# Patient Record
Sex: FEMALE | Race: WHITE | NOT HISPANIC OR LATINO | URBAN - METROPOLITAN AREA
[De-identification: names, ages, dates, MRNs, and addresses within clinical notes are randomized per-mention and may not be internally consistent; named-entity substitution may affect disease eponyms.]

---

## 2017-03-19 ENCOUNTER — INPATIENT (INPATIENT)
Facility: HOSPITAL | Age: 82
LOS: 3 days | Discharge: NURSING, REHAB OR LONG-TERM CARE | End: 2017-03-23
Attending: INTERNAL MEDICINE | Admitting: INTERNAL MEDICINE
Payer: MEDICARE

## 2017-03-19 VITALS
RESPIRATION RATE: 15 BRPM | HEIGHT: 71 IN | SYSTOLIC BLOOD PRESSURE: 156 MMHG | WEIGHT: 175.05 LBS | DIASTOLIC BLOOD PRESSURE: 69 MMHG | HEART RATE: 62 BPM | OXYGEN SATURATION: 100 %

## 2017-03-19 LAB
ALBUMIN SERPL ELPH-MCNC: 2.4 G/DL — LOW (ref 3.3–5)
ALBUMIN SERPL ELPH-MCNC: 3.1 G/DL — LOW (ref 3.3–5)
ALP SERPL-CCNC: 112 U/L — SIGNIFICANT CHANGE UP (ref 40–120)
ALP SERPL-CCNC: 89 U/L — SIGNIFICANT CHANGE UP (ref 40–120)
ALT FLD-CCNC: 24 U/L — SIGNIFICANT CHANGE UP (ref 12–78)
ALT FLD-CCNC: 33 U/L — SIGNIFICANT CHANGE UP (ref 12–78)
ANION GAP SERPL CALC-SCNC: 14 MMOL/L — SIGNIFICANT CHANGE UP (ref 5–17)
ANION GAP SERPL CALC-SCNC: 9 MMOL/L — SIGNIFICANT CHANGE UP (ref 5–17)
APPEARANCE UR: CLEAR — SIGNIFICANT CHANGE UP
APTT BLD: 38.7 SEC — HIGH (ref 27.5–37.4)
AST SERPL-CCNC: 48 U/L — HIGH (ref 15–37)
AST SERPL-CCNC: 75 U/L — HIGH (ref 15–37)
BACTERIA # UR AUTO: (no result)
BASOPHILS # BLD AUTO: 0.1 K/UL — SIGNIFICANT CHANGE UP (ref 0–0.2)
BASOPHILS # BLD AUTO: 0.1 K/UL — SIGNIFICANT CHANGE UP (ref 0–0.2)
BASOPHILS NFR BLD AUTO: 0.7 % — SIGNIFICANT CHANGE UP (ref 0–2)
BASOPHILS NFR BLD AUTO: 0.8 % — SIGNIFICANT CHANGE UP (ref 0–2)
BILIRUB DIRECT SERPL-MCNC: 0.3 MG/DL — HIGH (ref 0–0.2)
BILIRUB INDIRECT FLD-MCNC: 1 MG/DL — SIGNIFICANT CHANGE UP (ref 0.2–1)
BILIRUB SERPL-MCNC: 1.2 MG/DL — SIGNIFICANT CHANGE UP (ref 0.2–1.2)
BILIRUB SERPL-MCNC: 1.3 MG/DL — HIGH (ref 0.2–1.2)
BILIRUB SERPL-MCNC: 2.5 MG/DL — HIGH (ref 0.2–1.2)
BILIRUB UR-MCNC: NEGATIVE — SIGNIFICANT CHANGE UP
BUN SERPL-MCNC: 28 MG/DL — HIGH (ref 7–23)
BUN SERPL-MCNC: 29 MG/DL — HIGH (ref 7–23)
CALCIUM SERPL-MCNC: 8.5 MG/DL — SIGNIFICANT CHANGE UP (ref 8.5–10.1)
CALCIUM SERPL-MCNC: 9.3 MG/DL — SIGNIFICANT CHANGE UP (ref 8.5–10.1)
CHLORIDE SERPL-SCNC: 100 MMOL/L — SIGNIFICANT CHANGE UP (ref 96–108)
CHLORIDE SERPL-SCNC: 105 MMOL/L — SIGNIFICANT CHANGE UP (ref 96–108)
CK SERPL-CCNC: 818 U/L — HIGH (ref 26–192)
CO2 SERPL-SCNC: 28 MMOL/L — SIGNIFICANT CHANGE UP (ref 22–31)
CO2 SERPL-SCNC: 28 MMOL/L — SIGNIFICANT CHANGE UP (ref 22–31)
COLOR SPEC: YELLOW — SIGNIFICANT CHANGE UP
CREAT SERPL-MCNC: 0.61 MG/DL — SIGNIFICANT CHANGE UP (ref 0.5–1.3)
CREAT SERPL-MCNC: 0.61 MG/DL — SIGNIFICANT CHANGE UP (ref 0.5–1.3)
DIFF PNL FLD: (no result)
EOSINOPHIL # BLD AUTO: 0 K/UL — SIGNIFICANT CHANGE UP (ref 0–0.5)
EOSINOPHIL # BLD AUTO: 0 K/UL — SIGNIFICANT CHANGE UP (ref 0–0.5)
EOSINOPHIL NFR BLD AUTO: 0.1 % — SIGNIFICANT CHANGE UP (ref 0–6)
EOSINOPHIL NFR BLD AUTO: 0.2 % — SIGNIFICANT CHANGE UP (ref 0–6)
EPI CELLS # UR: SIGNIFICANT CHANGE UP
GLUCOSE SERPL-MCNC: 100 MG/DL — HIGH (ref 70–99)
GLUCOSE SERPL-MCNC: 134 MG/DL — HIGH (ref 70–99)
GLUCOSE UR QL: NEGATIVE MG/DL — SIGNIFICANT CHANGE UP
HCT VFR BLD CALC: 36 % — SIGNIFICANT CHANGE UP (ref 34.5–45)
HCT VFR BLD CALC: 42.3 % — SIGNIFICANT CHANGE UP (ref 34.5–45)
HGB BLD-MCNC: 11.7 G/DL — SIGNIFICANT CHANGE UP (ref 11.5–15.5)
HGB BLD-MCNC: 13.7 G/DL — SIGNIFICANT CHANGE UP (ref 11.5–15.5)
INR BLD: 1.22 RATIO — HIGH (ref 0.88–1.16)
KETONES UR-MCNC: (no result)
LACTATE SERPL-SCNC: 1.6 MMOL/L — SIGNIFICANT CHANGE UP (ref 0.7–2)
LEUKOCYTE ESTERASE UR-ACNC: NEGATIVE — SIGNIFICANT CHANGE UP
LYMPHOCYTES # BLD AUTO: 0.7 K/UL — LOW (ref 1–3.3)
LYMPHOCYTES # BLD AUTO: 0.7 K/UL — LOW (ref 1–3.3)
LYMPHOCYTES # BLD AUTO: 4.4 % — LOW (ref 13–44)
LYMPHOCYTES # BLD AUTO: 5.4 % — LOW (ref 13–44)
MAGNESIUM SERPL-MCNC: 1.6 MG/DL — LOW (ref 1.8–2.4)
MAGNESIUM SERPL-MCNC: 2.1 MG/DL — SIGNIFICANT CHANGE UP (ref 1.8–2.4)
MCHC RBC-ENTMCNC: 27.4 PG — SIGNIFICANT CHANGE UP (ref 27–34)
MCHC RBC-ENTMCNC: 27.6 PG — SIGNIFICANT CHANGE UP (ref 27–34)
MCHC RBC-ENTMCNC: 32.4 GM/DL — SIGNIFICANT CHANGE UP (ref 32–36)
MCHC RBC-ENTMCNC: 32.5 GM/DL — SIGNIFICANT CHANGE UP (ref 32–36)
MCV RBC AUTO: 84.7 FL — SIGNIFICANT CHANGE UP (ref 80–100)
MCV RBC AUTO: 85 FL — SIGNIFICANT CHANGE UP (ref 80–100)
MONOCYTES # BLD AUTO: 0.8 K/UL — SIGNIFICANT CHANGE UP (ref 0–0.9)
MONOCYTES # BLD AUTO: 1.1 K/UL — HIGH (ref 0–0.9)
MONOCYTES NFR BLD AUTO: 4.9 % — SIGNIFICANT CHANGE UP (ref 2–14)
MONOCYTES NFR BLD AUTO: 9.1 % — SIGNIFICANT CHANGE UP (ref 2–14)
NEUTROPHILS # BLD AUTO: 10.4 K/UL — HIGH (ref 1.8–7.4)
NEUTROPHILS # BLD AUTO: 14.6 K/UL — HIGH (ref 1.8–7.4)
NEUTROPHILS NFR BLD AUTO: 84.5 % — HIGH (ref 43–77)
NEUTROPHILS NFR BLD AUTO: 89.7 % — HIGH (ref 43–77)
NITRITE UR-MCNC: NEGATIVE — SIGNIFICANT CHANGE UP
PH UR: 5 — SIGNIFICANT CHANGE UP (ref 4.8–8)
PHOSPHATE SERPL-MCNC: 3 MG/DL — SIGNIFICANT CHANGE UP (ref 2.5–4.5)
PLATELET # BLD AUTO: 264 K/UL — SIGNIFICANT CHANGE UP (ref 150–400)
PLATELET # BLD AUTO: 337 K/UL — SIGNIFICANT CHANGE UP (ref 150–400)
POTASSIUM SERPL-MCNC: 3.2 MMOL/L — LOW (ref 3.5–5.3)
POTASSIUM SERPL-MCNC: 3.4 MMOL/L — LOW (ref 3.5–5.3)
POTASSIUM SERPL-SCNC: 3.2 MMOL/L — LOW (ref 3.5–5.3)
POTASSIUM SERPL-SCNC: 3.4 MMOL/L — LOW (ref 3.5–5.3)
PROT SERPL-MCNC: 5.7 GM/DL — LOW (ref 6–8.3)
PROT SERPL-MCNC: 7.3 GM/DL — SIGNIFICANT CHANGE UP (ref 6–8.3)
PROT UR-MCNC: 30 MG/DL
PROTHROM AB SERPL-ACNC: 13.5 SEC — HIGH (ref 10–13.1)
RBC # BLD: 4.24 M/UL — SIGNIFICANT CHANGE UP (ref 3.8–5.2)
RBC # BLD: 5 M/UL — SIGNIFICANT CHANGE UP (ref 3.8–5.2)
RBC # FLD: 12.9 % — SIGNIFICANT CHANGE UP (ref 10.3–14.5)
RBC # FLD: 13 % — SIGNIFICANT CHANGE UP (ref 10.3–14.5)
RBC CASTS # UR COMP ASSIST: (no result) /HPF (ref 0–4)
SODIUM SERPL-SCNC: 142 MMOL/L — SIGNIFICANT CHANGE UP (ref 135–145)
SODIUM SERPL-SCNC: 142 MMOL/L — SIGNIFICANT CHANGE UP (ref 135–145)
SP GR SPEC: 1.02 — SIGNIFICANT CHANGE UP (ref 1.01–1.02)
TROPONIN I SERPL-MCNC: 0.02 NG/ML — SIGNIFICANT CHANGE UP (ref 0.01–0.04)
TROPONIN I SERPL-MCNC: 0.03 NG/ML — SIGNIFICANT CHANGE UP (ref 0.01–0.04)
UROBILINOGEN FLD QL: NEGATIVE MG/DL — SIGNIFICANT CHANGE UP
WBC # BLD: 12.4 K/UL — HIGH (ref 3.8–10.5)
WBC # BLD: 16.2 K/UL — HIGH (ref 3.8–10.5)
WBC # FLD AUTO: 12.4 K/UL — HIGH (ref 3.8–10.5)
WBC # FLD AUTO: 16.2 K/UL — HIGH (ref 3.8–10.5)
WBC UR QL: SIGNIFICANT CHANGE UP

## 2017-03-19 PROCEDURE — 99285 EMERGENCY DEPT VISIT HI MDM: CPT | Mod: 25

## 2017-03-19 PROCEDURE — 93010 ELECTROCARDIOGRAM REPORT: CPT

## 2017-03-19 PROCEDURE — 72125 CT NECK SPINE W/O DYE: CPT | Mod: 26

## 2017-03-19 PROCEDURE — 71250 CT THORAX DX C-: CPT | Mod: 26

## 2017-03-19 PROCEDURE — 70450 CT HEAD/BRAIN W/O DYE: CPT | Mod: 26

## 2017-03-19 PROCEDURE — 74176 CT ABD & PELVIS W/O CONTRAST: CPT | Mod: 26

## 2017-03-19 RX ORDER — POTASSIUM CHLORIDE 20 MEQ
10 PACKET (EA) ORAL ONCE
Qty: 0 | Refills: 0 | Status: COMPLETED | OUTPATIENT
Start: 2017-03-19 | End: 2017-03-19

## 2017-03-19 RX ORDER — DONEPEZIL HYDROCHLORIDE 10 MG/1
5 TABLET, FILM COATED ORAL AT BEDTIME
Qty: 0 | Refills: 0 | Status: DISCONTINUED | OUTPATIENT
Start: 2017-03-19 | End: 2017-03-23

## 2017-03-19 RX ORDER — MAGNESIUM SULFATE 500 MG/ML
2 VIAL (ML) INJECTION ONCE
Qty: 0 | Refills: 0 | Status: COMPLETED | OUTPATIENT
Start: 2017-03-19 | End: 2017-03-19

## 2017-03-19 RX ORDER — HEPARIN SODIUM 5000 [USP'U]/ML
5000 INJECTION INTRAVENOUS; SUBCUTANEOUS EVERY 12 HOURS
Qty: 0 | Refills: 0 | Status: DISCONTINUED | OUTPATIENT
Start: 2017-03-19 | End: 2017-03-23

## 2017-03-19 RX ORDER — ASPIRIN/CALCIUM CARB/MAGNESIUM 324 MG
81 TABLET ORAL DAILY
Qty: 0 | Refills: 0 | Status: DISCONTINUED | OUTPATIENT
Start: 2017-03-19 | End: 2017-03-23

## 2017-03-19 RX ORDER — SODIUM CHLORIDE 9 MG/ML
1000 INJECTION INTRAMUSCULAR; INTRAVENOUS; SUBCUTANEOUS
Qty: 0 | Refills: 0 | Status: DISCONTINUED | OUTPATIENT
Start: 2017-03-19 | End: 2017-03-20

## 2017-03-19 RX ORDER — CEFTRIAXONE 500 MG/1
1 INJECTION, POWDER, FOR SOLUTION INTRAMUSCULAR; INTRAVENOUS ONCE
Qty: 0 | Refills: 0 | Status: COMPLETED | OUTPATIENT
Start: 2017-03-19 | End: 2017-03-19

## 2017-03-19 RX ORDER — LIDOCAINE 4 G/100G
1 CREAM TOPICAL DAILY
Qty: 0 | Refills: 0 | Status: DISCONTINUED | OUTPATIENT
Start: 2017-03-19 | End: 2017-03-23

## 2017-03-19 RX ORDER — ACETAMINOPHEN 500 MG
650 TABLET ORAL EVERY 6 HOURS
Qty: 0 | Refills: 0 | Status: DISCONTINUED | OUTPATIENT
Start: 2017-03-19 | End: 2017-03-23

## 2017-03-19 RX ORDER — ATORVASTATIN CALCIUM 80 MG/1
20 TABLET, FILM COATED ORAL AT BEDTIME
Qty: 0 | Refills: 0 | Status: DISCONTINUED | OUTPATIENT
Start: 2017-03-19 | End: 2017-03-23

## 2017-03-19 RX ORDER — SODIUM CHLORIDE 9 MG/ML
2000 INJECTION INTRAMUSCULAR; INTRAVENOUS; SUBCUTANEOUS ONCE
Qty: 0 | Refills: 0 | Status: COMPLETED | OUTPATIENT
Start: 2017-03-19 | End: 2017-03-19

## 2017-03-19 RX ORDER — DOCUSATE SODIUM 100 MG
100 CAPSULE ORAL THREE TIMES A DAY
Qty: 0 | Refills: 0 | Status: DISCONTINUED | OUTPATIENT
Start: 2017-03-19 | End: 2017-03-23

## 2017-03-19 RX ADMIN — LIDOCAINE 1 PATCH: 4 CREAM TOPICAL at 11:21

## 2017-03-19 RX ADMIN — CEFTRIAXONE 100 GRAM(S): 500 INJECTION, POWDER, FOR SOLUTION INTRAMUSCULAR; INTRAVENOUS at 04:00

## 2017-03-19 RX ADMIN — Medication 100 MILLIGRAM(S): at 23:03

## 2017-03-19 RX ADMIN — SODIUM CHLORIDE 75 MILLILITER(S): 9 INJECTION INTRAMUSCULAR; INTRAVENOUS; SUBCUTANEOUS at 11:21

## 2017-03-19 RX ADMIN — SODIUM CHLORIDE 1000 MILLILITER(S): 9 INJECTION INTRAMUSCULAR; INTRAVENOUS; SUBCUTANEOUS at 03:30

## 2017-03-19 RX ADMIN — ATORVASTATIN CALCIUM 20 MILLIGRAM(S): 80 TABLET, FILM COATED ORAL at 23:03

## 2017-03-19 RX ADMIN — Medication 81 MILLIGRAM(S): at 11:21

## 2017-03-19 RX ADMIN — Medication 100 MILLIEQUIVALENT(S): at 05:49

## 2017-03-19 RX ADMIN — HEPARIN SODIUM 5000 UNIT(S): 5000 INJECTION INTRAVENOUS; SUBCUTANEOUS at 17:51

## 2017-03-19 RX ADMIN — Medication 50 GRAM(S): at 03:30

## 2017-03-19 RX ADMIN — SODIUM CHLORIDE 75 MILLILITER(S): 9 INJECTION INTRAMUSCULAR; INTRAVENOUS; SUBCUTANEOUS at 23:07

## 2017-03-19 RX ADMIN — Medication 100 MILLIGRAM(S): at 13:43

## 2017-03-19 RX ADMIN — DONEPEZIL HYDROCHLORIDE 5 MILLIGRAM(S): 10 TABLET, FILM COATED ORAL at 23:03

## 2017-03-19 RX ADMIN — LIDOCAINE 1 PATCH: 4 CREAM TOPICAL at 22:05

## 2017-03-19 NOTE — ED PROVIDER NOTE - DETAILS:
Rut Patiño MD - The scribe's documentation has been prepared under my direction and personally reviewed by me in its entirety. I confirm that the note above accurately reflects all work, treatment, procedures, and medical decision making performed by me.

## 2017-03-19 NOTE — ED ADULT NURSE REASSESSMENT NOTE - NS ED NURSE REASSESS COMMENT FT1
Pt received at 0700, resting comfortably in bed, no complaints at this time, pt pulled off wrist band, new one placed. All needs anticipated and met, safety maintained will [id number] to monitor

## 2017-03-19 NOTE — H&P ADULT - NSHPREVIEWOFSYSTEMS_GEN_ALL_CORE
REVIEW OF SYSTEMS:  General: No weakness, fevers, chills  HEENT: No HA, neck pain, no visual changes, no hearing loss   Chest: rib pain  Resp: No cough, wheezing, hemoptysis; No shortness of breath  CV: No chest pain or palpitations  GI: No abdominal pain, no n/v/d, no blood in stool  : No f/u/d  Neuro: No weakness or numbness  MSK: No myalgias, arthralgias  SKIN: No itching, rashes, lesions  All other ROS negative unless indicated above.

## 2017-03-19 NOTE — H&P ADULT - NSHPPHYSICALEXAM_GEN_ALL_CORE
Vital Signs Last 24 Hrs  T(C): --  T(F): --  HR: 62 (62 - 62)  BP: 156/69 (156/69 - 156/69)  BP(mean): --  RR: 15 (15 - 15)  SpO2: 100% (100% - 100%) Vital Signs Last 24 Hrs  HR: 62 (62 - 62)  BP: 156/69 (156/69 - 156/69)  BP(mean): --  RR: 15 (15 - 15)  SpO2: 100% (100% - 100%)      PHYSICAL EXAM:  General: elderly female, comfortably seated in bed, nad  Neuro: AAOx3, no focal deficits  HEENT: NCAT, EOMI  Neck: Soft and supple, No JVD  Respiratory: CTA b/l, no w/r/r  Chest: left chest wall ttp, no ecchymosis, lidoderm patch in place   Cardiovascular: S1 and S2, RRR,  4/6 DEAN   Gastrointestinal: obese, +BS, soft, NTND, no rebound/guarding  Extremities: chronic changes LE, nonpitting edema  Vascular: 2+ peripheral pulses  Musculoskeletal: 5/5 strength b/l UE and LE, sensation intact  Skin: No rashes

## 2017-03-19 NOTE — ED PROVIDER NOTE - NS ED MD SCRIBE ATTENDING SCRIBE SECTIONS
PROGRESS NOTE HISTORY OF PRESENT ILLNESS/PAST MEDICAL/SURGICAL/SOCIAL HISTORY/PHYSICAL EXAM/REVIEW OF SYSTEMS/PROGRESS NOTE

## 2017-03-19 NOTE — H&P ADULT - NSHPLABSRESULTS_GEN_ALL_CORE
LABS: All Labs Reviewed:                        13.7   16.2  )-----------( 337      ( 19 Mar 2017 02:13 )             42.3     19 Mar 2017 02:13    142    |  100    |  28     ----------------------------<  100    3.4     |  28     |  0.61     Ca    9.3        19 Mar 2017 02:13  Mg     1.6       19 Mar 2017 02:13    TPro  7.3    /  Alb  3.1    /  TBili  2.5    /  DBili  x      /  AST  75     /  ALT  33     /  AlkPhos  112    19 Mar 2017 02:13    PT/INR - ( 19 Mar 2017 02:13 )   PT: 13.5 sec;   INR: 1.22 ratio         PTT - ( 19 Mar 2017 02:13 )  PTT:38.7 sec  CARDIAC MARKERS ( 19 Mar 2017 02:13 )  0.024 ng/mL / x     / x     / x     / x            CT BRAIN 03/19/2017  and CT CERVICAL SPINE   IMPRESSION:  Head CT: No acute intracranial hemorrhage or calvarial fracture. Stable   mild chronic microvascular change.  Cervical spine CT: Age-indeterminate, likely chronic, fracture through   anterior superior C6with involvement of a bridging anterior osteophyte.   No evidence of traumatic malalignment. No prevertebral soft tissue   swelling or epidural hematoma.    CT ABDOMEN AND PELVIS      03/19/2017  and  CT CHEST   IMPRESSION:  Age-indeterminate left lateral 6th through 8th rib fractures. Trace   bilateral pleural effusions.  No posttraumatic sequela in the abdomen or pelvis.  Indeterminate 4.6 cm right renal mass, as mentioned on prior   contrast-enhanced CT, suspicious for neoplasm. LABS: All Labs Reviewed:                        13.7   16.2  )-----------( 337      ( 19 Mar 2017 02:13 )             42.3     19 Mar 2017 02:13    142    |  100    |  28     ----------------------------<  100    3.4     |  28     |  0.61     Ca    9.3        19 Mar 2017 02:13  Mg     1.6       19 Mar 2017 02:13    TPro  7.3    /  Alb  3.1    /  TBili  2.5    /  DBili  x      /  AST  75     /  ALT  33     /  AlkPhos  112    19 Mar 2017 02:13    PT/INR - ( 19 Mar 2017 02:13 )   PT: 13.5 sec;   INR: 1.22 ratio         PTT - ( 19 Mar 2017 02:13 )  PTT:38.7 sec  CARDIAC MARKERS ( 19 Mar 2017 02:13 )  0.024 ng/mL / x     / x     / x     / x            CT BRAIN 03/19/2017  and CT CERVICAL SPINE   IMPRESSION:  Head CT: No acute intracranial hemorrhage or calvarial fracture. Stable   mild chronic microvascular change.  Cervical spine CT: Age-indeterminate, likely chronic, fracture through   anterior superior C6with involvement of a bridging anterior osteophyte.   No evidence of traumatic malalignment. No prevertebral soft tissue   swelling or epidural hematoma.    CT ABDOMEN AND PELVIS      03/19/2017  and  CT CHEST   IMPRESSION:  Age-indeterminate left lateral 6th through 8th rib fractures. Trace   bilateral pleural effusions.  No posttraumatic sequela in the abdomen or pelvis.  Indeterminate 4.6 cm right renal mass, as mentioned on prior   contrast-enhanced CT, suspicious for neoplasm.    MEDICATIONS  (STANDING):  heparin  Injectable 5000Unit(s) SubCutaneous every 12 hours  docusate sodium 100milliGRAM(s) Oral three times a day  donepezil 5milliGRAM(s) Oral at bedtime  aspirin  chewable 81milliGRAM(s) Oral daily  atorvastatin 20milliGRAM(s) Oral at bedtime  lidocaine   Patch 1Patch Transdermal daily  sodium chloride 0.9%. 1000milliLiter(s) IV Continuous <Continuous>    MEDICATIONS  (PRN):  acetaminophen   Tablet 650milliGRAM(s) Oral every 6 hours PRN pain, headache, temp > 100.4

## 2017-03-19 NOTE — H&P ADULT - HISTORY OF PRESENT ILLNESS
81y female w/ pmh HTN, dyslipidemia, ?SAH after fall, mild diastolic dysfunction, chronic LE edema was brought to ED  after -  after being found her floor at home by daughter (pt lives alone w/o aide).  As per chart pt also had fall Thurs and refused to come to ED. /co rib pain.         Pt was last admitted here twice in April 2016 w/ fall, rhabdo and confusion.  At that time it was discussed w/ daughter that pt has been falling frequently but refused home care and insisted on living alone.   Pt was then discharged to rehab at [facility] and returned a few days later w/ confusion.  She eval by Psych during that visit and deemed to have delusional, odd behavior and possibly new onset dementia and aricept started.      Pt Renal mass, thrombosis?  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 Mean transmitral gradient is 5mmHg; consistent  with mild mitral stenosis at worst. Moderate posterior mitral leaflet and posterior mitral annular calcification. Trace mitral  regurgitation is present. Fibrocalcific changes noted to the aortic valve leaflets with mild restriction in leaflet excursion.  Mean transaortic gradient is 16mmHg with a peak velocity of 2.9m/sec; consistent with mild aortic stenosis at worst. No  aortic insufficiency was seen. The left atrium is mildly dilated. The left ventricle is normal in size. Left ventricular wall  motion is normal. Estimated left ventricular ejection fraction is 65 % 81y female w/ pmh HTN, dyslipidemia, ?SAH after fall, mild diastolic dysfunction, chronic LE edema was brought to ED  after   being found on her floor at home by daughter (pt lives alone w/o aide).  As per chart pt also had fall on Thursday but refused to come to ED.  Pt c/o rib pain and found to have age indeterminate rib fractures.   As per chart, pt last admitted here twice in April 2016 w/ fall, rhabdo and confusion.  At that time it was discussed w/ daughter that pt has been falling frequently but refused home care and insisted on living alone.   Pt was then discharged to rehab at [facility] and returned a few days later w/ confusion.  She eval by Psych during that visit and deemed to have delusional, odd behavior and possibly new onset dementia and aricept started.       Pt seen and examined earlier today.  She is AAOx 3, understands why she is here, admits to living alone and refusing home care for years.  States she pays bills and makes pea pod meals, ambulates w/ walker and then states only thing she doesn't do is drive.    Pt states she lost her balance at home, denies LOC, hitting head, preceding dizziness/cp/palp.   Feeling well now and asking when she can leave.  Complains of left rib pain.         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

## 2017-03-19 NOTE — ED PROVIDER NOTE - CARE PLAN
Principal Discharge DX:	UTI (urinary tract infection)  Secondary Diagnosis:	Rib fractures  Secondary Diagnosis:	Hypomagnesemia

## 2017-03-19 NOTE — ED PROVIDER NOTE - OBJECTIVE STATEMENT
80 y/o female presents to ED due to a fall. Pt daughter went to her house and found her lying on the kitchen floor. Pt also had a fall on Thursday for which she refused to come to the ER. Daughter said that when she fell on Thursday pt c/o hurt ribs but did not want to be evaluated. C/O left ribs hurt with certain movements. The daughter states that she lives alone, does not have an aide, has no help and is not caring adequately for herself. Pt had a fall last year, went to rehab upon discharge APS was involved. Pt was determined to be competent and was let be. The daughter is now concerned in the past week pt has been increasingly confused, paranoid, visual hallucinations.

## 2017-03-19 NOTE — ED PROVIDER NOTE - MEDICAL DECISION MAKING DETAILS
82 yo female with frequent falls, not taking care of herself, dehydrated, electrolyte abnormalities, urinary tract infection, and multiple rib fractures (3 days old), will be admitted for treatment and for possible placement.

## 2017-03-19 NOTE — ED ADULT NURSE NOTE - OBJECTIVE STATEMENT
81 year old female presenting to the ED via EMS for decreasing mentation and frequent falls. Patient is AMS upon time of examination, GCS 14. Patient reports that she isn't aware that she has been having falls and "doesn't want to be in this (expletive) ER." Daughter reports that the patient has not been answering her phone, and she went to her house to check on her, finding her on the ground, unable to get up. Daughter reports patient lives alone. Patient soiled upon initial assessment.

## 2017-03-19 NOTE — H&P ADULT - ASSESSMENT
A&P  81y female admitted w/     *age indeterminate left lateral 6th through 8th rib fractures due to mechanical fall   - pt denies preceding sx, ekg unremarkable, CT head neg, Cspine- chronic C6 fx   - significant DEAN auscultated- will check echo for completeness (prior 4/2016 w/ nml valvular function)  - pain control, fall precautions  - PT eval  - Surgery f/u appreciated   - SW, Case management eval- pt w/ recurrent admissions for falls, lives alone, unsafe d/c home    *mild rhabdomyolysis  - due to falls (Thurs, Sat)  - IVF, repeat cpk, monitor lytes     *leukocytosis  - likely reactive, repeat cbc  - afebrile, no signs infections, UA neg.     *hypokalemia  - replete    *HTN, HLD  - home meds    *?early dementia as per 4/2016 notes  - pt prescribed aricept at that time but not taking    *renal mass dx 6/2015  - pt aware and has refused workup in past- discussed again and states she wants to think about further eval    *dvt px   - heparin sc     *code status  - full code- pt wants to think about it

## 2017-03-19 NOTE — CONSULT NOTE ADULT - SUBJECTIVE AND OBJECTIVE BOX
89 yo fem h/o fall April 2016, who apparently fell 3 days ago, refused to come to ER, patient was brought last night. CT Head normal. CT cervical spine likely old fracture C6. CT chest showed 3 left rib fractures. Patient denies any chest pain or abdominal pain.    PMH: HTN, dyslipidemia, renal mass on previous CT, lower extremities chronic edema    PSH: appy  FH: non contributory    NKDA  Meds: see Chart    ROS: After reviewing 12 different points which are normal except, frequent falls, lower ext edema, no abd pain        CBC Full  -  ( 19 Mar 2017 02:13 )  WBC Count : 16.2 K/uL  Hemoglobin : 13.7 g/dL  Hematocrit : 42.3 %  Platelet Count - Automated : 337 K/uL  Mean Cell Volume : 84.7 fl  Mean Cell Hemoglobin : 27.4 pg  Mean Cell Hemoglobin Concentration : 32.4 gm/dL  Auto Neutrophil # : 14.6 K/uL  Auto Lymphocyte # : 0.7 K/uL  Auto Monocyte # : 0.8 K/uL  Auto Eosinophil # : 0.0 K/uL  Auto Basophil # : 0.1 K/uL  Auto Neutrophil % : 89.7 %  Auto Lymphocyte % : 4.4 %  Auto Monocyte % : 4.9 %  Auto Eosinophil % : 0.1 %  Auto Basophil % : 0.8 %        Vital Signs Last 24 Hrs  T(C): --  T(F): --  HR: 64 (62 - 64)  BP: 135/68 (135/68 - 156/69)  BP(mean): --  RR: 16 (15 - 16)  SpO2: 100% (100% - 100%)    LIVER FUNCTIONS - ( 19 Mar 2017 02:13 )  Alb: 3.1 g/dL / Pro: 7.3 gm/dL / ALK PHOS: 112 U/L / ALT: 33 U/L / AST: 75 U/L / GGT: x             MEDICATIONS  (STANDING):  heparin  Injectable 5000Unit(s) SubCutaneous every 12 hours  docusate sodium 100milliGRAM(s) Oral three times a day  donepezil 5milliGRAM(s) Oral at bedtime  aspirin  chewable 81milliGRAM(s) Oral daily  atorvastatin 20milliGRAM(s) Oral at bedtime  lidocaine   Patch 1Patch Transdermal daily  sodium chloride 0.9%. 1000milliLiter(s) IV Continuous <Continuous>    MEDICATIONS  (PRN):  acetaminophen   Tablet 650milliGRAM(s) Oral every 6 hours PRN pain, headache, temp > 100.4      MEDICATIONS  (STANDING):  heparin  Injectable 5000Unit(s) SubCutaneous every 12 hours  docusate sodium 100milliGRAM(s) Oral three times a day  donepezil 5milliGRAM(s) Oral at bedtime  aspirin  chewable 81milliGRAM(s) Oral daily  atorvastatin 20milliGRAM(s) Oral at bedtime  lidocaine   Patch 1Patch Transdermal daily  sodium chloride 0.9%. 1000milliLiter(s) IV Continuous <Continuous>      19 Mar 2017 02:13    142    |  100    |  28     ----------------------------<  100    3.4     |  28     |  0.61     Ca    9.3        19 Mar 2017 02:13  Mg     1.6       19 Mar 2017 02:13    TPro  7.3    /  Alb  3.1    /  TBili  2.5    /  DBili  x      /  AST  75     /  ALT  33     /  AlkPhos  112    19 Mar 2017 02:13    IMPRESSION:  Age-indeterminate left lateral 6th through 8th rib fractures. Trace   bilateral pleural effusions.    No posttraumatic sequela in the abdomen or pelvis.    Indeterminate 4.6 cm right renal mass, as mentioned on prior   contrast-enhanced CT, suspicious for neoplasm.    Cervical spine CT: Age-indeterminate, likely chronic, fracture through   anterior superior C6 with involvement of a bridging anterior osteophyte.   No evidence of traumatic malalignment. No prevertebral soft tissue   swelling or epidural hematoma.

## 2017-03-20 LAB
ANION GAP SERPL CALC-SCNC: 9 MMOL/L — SIGNIFICANT CHANGE UP (ref 5–17)
BUN SERPL-MCNC: 26 MG/DL — HIGH (ref 7–23)
CALCIUM SERPL-MCNC: 8 MG/DL — LOW (ref 8.5–10.1)
CHLORIDE SERPL-SCNC: 109 MMOL/L — HIGH (ref 96–108)
CK SERPL-CCNC: 294 U/L — HIGH (ref 26–192)
CO2 SERPL-SCNC: 29 MMOL/L — SIGNIFICANT CHANGE UP (ref 22–31)
CREAT SERPL-MCNC: 0.58 MG/DL — SIGNIFICANT CHANGE UP (ref 0.5–1.3)
GLUCOSE SERPL-MCNC: 115 MG/DL — HIGH (ref 70–99)
HCT VFR BLD CALC: 30.8 % — LOW (ref 34.5–45)
HGB BLD-MCNC: 10 G/DL — LOW (ref 11.5–15.5)
MCHC RBC-ENTMCNC: 27.7 PG — SIGNIFICANT CHANGE UP (ref 27–34)
MCHC RBC-ENTMCNC: 32.6 GM/DL — SIGNIFICANT CHANGE UP (ref 32–36)
MCV RBC AUTO: 84.9 FL — SIGNIFICANT CHANGE UP (ref 80–100)
PLATELET # BLD AUTO: 279 K/UL — SIGNIFICANT CHANGE UP (ref 150–400)
POTASSIUM SERPL-MCNC: 3 MMOL/L — LOW (ref 3.5–5.3)
POTASSIUM SERPL-SCNC: 3 MMOL/L — LOW (ref 3.5–5.3)
RBC # BLD: 3.63 M/UL — LOW (ref 3.8–5.2)
RBC # FLD: 13 % — SIGNIFICANT CHANGE UP (ref 10.3–14.5)
SODIUM SERPL-SCNC: 147 MMOL/L — HIGH (ref 135–145)
WBC # BLD: 8.7 K/UL — SIGNIFICANT CHANGE UP (ref 3.8–10.5)
WBC # FLD AUTO: 8.7 K/UL — SIGNIFICANT CHANGE UP (ref 3.8–10.5)

## 2017-03-20 PROCEDURE — 93306 TTE W/DOPPLER COMPLETE: CPT | Mod: 26

## 2017-03-20 RX ORDER — DEXTROSE MONOHYDRATE, SODIUM CHLORIDE, AND POTASSIUM CHLORIDE 50; .745; 4.5 G/1000ML; G/1000ML; G/1000ML
1000 INJECTION, SOLUTION INTRAVENOUS
Qty: 0 | Refills: 0 | Status: DISCONTINUED | OUTPATIENT
Start: 2017-03-20 | End: 2017-03-21

## 2017-03-20 RX ADMIN — HEPARIN SODIUM 5000 UNIT(S): 5000 INJECTION INTRAVENOUS; SUBCUTANEOUS at 05:38

## 2017-03-20 RX ADMIN — LIDOCAINE 1 PATCH: 4 CREAM TOPICAL at 11:25

## 2017-03-20 RX ADMIN — HEPARIN SODIUM 5000 UNIT(S): 5000 INJECTION INTRAVENOUS; SUBCUTANEOUS at 18:33

## 2017-03-20 RX ADMIN — DEXTROSE MONOHYDRATE, SODIUM CHLORIDE, AND POTASSIUM CHLORIDE 63 MILLILITER(S): 50; .745; 4.5 INJECTION, SOLUTION INTRAVENOUS at 10:02

## 2017-03-20 RX ADMIN — LIDOCAINE 1 PATCH: 4 CREAM TOPICAL at 22:25

## 2017-03-20 RX ADMIN — Medication 100 MILLIGRAM(S): at 12:47

## 2017-03-20 RX ADMIN — Medication 100 MILLIGRAM(S): at 05:38

## 2017-03-20 RX ADMIN — Medication 100 MILLIGRAM(S): at 22:25

## 2017-03-20 RX ADMIN — Medication 81 MILLIGRAM(S): at 11:25

## 2017-03-20 RX ADMIN — DONEPEZIL HYDROCHLORIDE 5 MILLIGRAM(S): 10 TABLET, FILM COATED ORAL at 22:25

## 2017-03-20 RX ADMIN — ATORVASTATIN CALCIUM 20 MILLIGRAM(S): 80 TABLET, FILM COATED ORAL at 22:25

## 2017-03-20 NOTE — PHYSICAL THERAPY INITIAL EVALUATION ADULT - IMPAIRMENTS FOUND, PT EVAL
gait, locomotion, and balance/posture/poor safety awareness/joint integrity and mobility/muscle strength

## 2017-03-20 NOTE — PHYSICAL THERAPY INITIAL EVALUATION ADULT - ADDITIONAL COMMENTS
Pt reports using a RW at baseline, but has been mostly bed bound as of recent, accuracy of statements unclear.

## 2017-03-21 LAB
ADD ON TEST-SPECIMEN IN LAB: SIGNIFICANT CHANGE UP
ANION GAP SERPL CALC-SCNC: 6 MMOL/L — SIGNIFICANT CHANGE UP (ref 5–17)
BUN SERPL-MCNC: 20 MG/DL — SIGNIFICANT CHANGE UP (ref 7–23)
CALCIUM SERPL-MCNC: 8.4 MG/DL — LOW (ref 8.5–10.1)
CHLORIDE SERPL-SCNC: 109 MMOL/L — HIGH (ref 96–108)
CK SERPL-CCNC: 196 U/L — HIGH (ref 26–192)
CO2 SERPL-SCNC: 31 MMOL/L — SIGNIFICANT CHANGE UP (ref 22–31)
CREAT SERPL-MCNC: 0.64 MG/DL — SIGNIFICANT CHANGE UP (ref 0.5–1.3)
GLUCOSE SERPL-MCNC: 125 MG/DL — HIGH (ref 70–99)
POTASSIUM SERPL-MCNC: 4 MMOL/L — SIGNIFICANT CHANGE UP (ref 3.5–5.3)
POTASSIUM SERPL-SCNC: 4 MMOL/L — SIGNIFICANT CHANGE UP (ref 3.5–5.3)
SODIUM SERPL-SCNC: 146 MMOL/L — HIGH (ref 135–145)

## 2017-03-21 RX ORDER — ACETAMINOPHEN 500 MG
2 TABLET ORAL
Qty: 0 | Refills: 0 | COMMUNITY
Start: 2017-03-21

## 2017-03-21 RX ORDER — OXYBUTYNIN CHLORIDE 5 MG
5 TABLET ORAL DAILY
Qty: 0 | Refills: 0 | Status: DISCONTINUED | OUTPATIENT
Start: 2017-03-21 | End: 2017-03-23

## 2017-03-21 RX ADMIN — Medication 100 MILLIGRAM(S): at 21:04

## 2017-03-21 RX ADMIN — ATORVASTATIN CALCIUM 20 MILLIGRAM(S): 80 TABLET, FILM COATED ORAL at 21:04

## 2017-03-21 RX ADMIN — LIDOCAINE 1 PATCH: 4 CREAM TOPICAL at 23:24

## 2017-03-21 RX ADMIN — HEPARIN SODIUM 5000 UNIT(S): 5000 INJECTION INTRAVENOUS; SUBCUTANEOUS at 17:44

## 2017-03-21 RX ADMIN — Medication 81 MILLIGRAM(S): at 11:58

## 2017-03-21 RX ADMIN — Medication 5 MILLIGRAM(S): at 17:44

## 2017-03-21 RX ADMIN — LIDOCAINE 1 PATCH: 4 CREAM TOPICAL at 11:58

## 2017-03-21 RX ADMIN — Medication 100 MILLIGRAM(S): at 13:23

## 2017-03-21 RX ADMIN — DONEPEZIL HYDROCHLORIDE 5 MILLIGRAM(S): 10 TABLET, FILM COATED ORAL at 21:04

## 2017-03-21 RX ADMIN — HEPARIN SODIUM 5000 UNIT(S): 5000 INJECTION INTRAVENOUS; SUBCUTANEOUS at 06:15

## 2017-03-21 RX ADMIN — Medication 100 MILLIGRAM(S): at 06:15

## 2017-03-22 RX ORDER — ASPIRIN/CALCIUM CARB/MAGNESIUM 324 MG
0 TABLET ORAL
Qty: 0 | Refills: 0 | COMMUNITY

## 2017-03-22 RX ORDER — LISINOPRIL 2.5 MG/1
10 TABLET ORAL DAILY
Qty: 0 | Refills: 0 | Status: DISCONTINUED | OUTPATIENT
Start: 2017-03-22 | End: 2017-03-23

## 2017-03-22 RX ORDER — ATORVASTATIN CALCIUM 80 MG/1
1 TABLET, FILM COATED ORAL
Qty: 0 | Refills: 0 | COMMUNITY

## 2017-03-22 RX ORDER — DONEPEZIL HYDROCHLORIDE 10 MG/1
1 TABLET, FILM COATED ORAL
Qty: 0 | Refills: 0 | COMMUNITY

## 2017-03-22 RX ORDER — SODIUM CHLORIDE 9 MG/ML
1000 INJECTION INTRAMUSCULAR; INTRAVENOUS; SUBCUTANEOUS
Qty: 0 | Refills: 0 | Status: DISCONTINUED | OUTPATIENT
Start: 2017-03-22 | End: 2017-03-23

## 2017-03-22 RX ORDER — OXYBUTYNIN CHLORIDE 5 MG
1 TABLET ORAL
Qty: 0 | Refills: 0 | COMMUNITY

## 2017-03-22 RX ADMIN — HEPARIN SODIUM 5000 UNIT(S): 5000 INJECTION INTRAVENOUS; SUBCUTANEOUS at 17:05

## 2017-03-22 RX ADMIN — SODIUM CHLORIDE 83 MILLILITER(S): 9 INJECTION INTRAMUSCULAR; INTRAVENOUS; SUBCUTANEOUS at 14:20

## 2017-03-22 RX ADMIN — HEPARIN SODIUM 5000 UNIT(S): 5000 INJECTION INTRAVENOUS; SUBCUTANEOUS at 05:43

## 2017-03-22 RX ADMIN — Medication 100 MILLIGRAM(S): at 12:12

## 2017-03-22 RX ADMIN — LIDOCAINE 1 PATCH: 4 CREAM TOPICAL at 12:12

## 2017-03-22 RX ADMIN — LISINOPRIL 10 MILLIGRAM(S): 2.5 TABLET ORAL at 17:05

## 2017-03-22 RX ADMIN — Medication 81 MILLIGRAM(S): at 12:12

## 2017-03-22 RX ADMIN — Medication 5 MILLIGRAM(S): at 12:12

## 2017-03-22 RX ADMIN — ATORVASTATIN CALCIUM 20 MILLIGRAM(S): 80 TABLET, FILM COATED ORAL at 21:57

## 2017-03-22 NOTE — CONSULT NOTE ADULT - SUBJECTIVE AND OBJECTIVE BOX
81y female w/ pmh HTN, dyslipidemia, ?SAH after fall, mild diastolic dysfunction, chronic LE edema was brought to ED  after   being found on her floor at home by daughter (pt lives alone w/o aide).  As per chart pt also had fall on Thursday but refused to come to ED.  Pt c/o rib pain and found to have age indeterminate rib fractures.   As per chart, pt last admitted here twice in April 2016 w/ fall, rhabdo and confusion.  At that time it was discussed w/ daughter that pt has been falling frequently but refused home care and insisted on living alone.   Pt was then discharged to rehab at [facility] and returned a few days later w/ confusion.  She eval by Psych during that visit and deemed to have delusional, odd behavior and possibly new onset dementia and aricept started.       On this visit pt has been  AAOx 3, understands why she is here, admits to living alone and refusing home care for years.  States she pays bills and makes pea pod meals, ambulates w/ walker and then states only thing she doesn't do is drive.    Pt states she lost her balance at home, denies LOC, hitting head, preceding dizziness/cp/palp.  Feeling well now and asking when she can leave.  Complains of left rib pain.     Psychiatric consult requested by daughter as she might be delusional (?)      Upon interview, pt is alert and oriented x3, calm, cooperative, gives a congruent history as chart indicated. She was able to perform simple calculations, and was alerted to situation and to place, time and date, and floor. Pt has no past psychiatric history, except for having been seen at  last year for a similar presentation in which she was found to be without acute psychiatric sx, and was found with capacity to make decisions regarding her health care and disposition.     Currently she denies depression denies SI, Denies AH/VH, no paranoia elicited. She was questioned about hoarding as it was indicated by SW that she was a hoarder, she denies hoarding but admitted to having collections of her late ' things in the basement as well as letitia moments figurines. A prominent issue that emerged was the feeling of being mishandled by her daughter whom she thinks wants her placed because it is easier that taking care of pt herself. Daughter works full time and is unable to visit daily as per pt. 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 She explained feelings of  being abandoned and a strong desire to live on her own. However, these thoughts did not seem delusional in nature but rather a lamentation on how even doing the right thing in life does not guarantee that life will pay you back. Pt was asked to comment on her refusal to come to ER initially, and she repiled that she was angry that her daughter called 911 and that she feels that she can handle things on her own. She did say however that it was a good thing that she came and that she will call 911 in the future if she fell and couldn't get up. Thought process was linear, organized, logical with possible confabulation at one instance. Pt refused writer to contact daughter and appeared to have capacity.    Mental Status Exam    Oriented x3 and to situation  Genreal Appearance: well nourished, overweight, no deformities present  Behavior: cooperative, good eye contact, well related  Muscle tone/ Strength: No abnormal movements  Gait/Station: wnl  Speech: normal volume and rate, spontaneous, normal articulation  Mood: slightly irritated, but otherwise euthymic  Affect: congruent full range  Thought Process: wnl, linear, goal oriented, normal reasoning.  Thought Associations: normal  Thought Content: ruminations, preoccupations, delusions  Perceptions: No AH, no VH, no illusions  Attention/Concentration: wnl  Recent Memory: wnl  Remote Memory: wnl  Fund of knowledge: wnl  Language: wnl, English speaking  Judgement : fair-good  Insight: fair- good

## 2017-03-22 NOTE — PROGRESS NOTE ADULT - SUBJECTIVE AND OBJECTIVE BOX
History of Present Illness: 	  81y female w/ pmh HTN, dyslipidemia, ?SAH after fall, mild diastolic dysfunction, chronic LE edema was brought to ED  after   being found on her floor at home by daughter (pt lives alone w/o aide).  As per chart pt also had fall on Thursday but refused to come to ED.  Pt c/o rib pain and found to have age indeterminate rib fractures.   As per chart, pt last admitted here twice in April 2016 w/ fall, rhabdo and confusion.  At that time it was discussed w/ daughter that pt has been falling frequently but refused home care and insisted on living alone.   Pt was then discharged to rehab at [facility] and returned a few days later w/ confusion.  She eval by Psych during that visit and deemed to have delusional, odd behavior and possibly new onset dementia and aricept started.       Pt seen and examined earlier today.  She is AAOx 3, understands why she is here, admits to living alone and refusing home care for years.  States she pays bills and makes pea pod meals, ambulates w/ walker and then states only thing she doesn't do is drive.    Pt states she lost her balance at home, denies LOC, hitting head, preceding dizziness/cp/palp.   Feeling well now and asking when she can leave.  Complains of left rib pain.     3/20- feeling better today.  Rib pain controlled.  OOB  w/ PT.  Doesn't want rehab.   Left voicemail for daughter, Yarely.   3/21- no complaints, considering rehab.  Will attempt to reach daughter again.     10pt ROS negative.     Vital Signs Last 24 Hrs  T(C): 36.9, Max: 37 (03-21 @ 05:13)  T(F): 98.4, Max: 98.6 (03-21 @ 05:13)  HR: 84 (84 - 90)  BP: 155/58 (130/54 - 155/58)  BP(mean): --  RR: 18 (18 - 18)  SpO2: 95% (94% - 99%)    PHYSICAL EXAM:  General: elderly female, comfortably seated in bed, nad  Neuro: AAOx3, no focal deficits  HEENT: NCAT, EOMI  Neck: Soft and supple, No JVD  Respiratory: CTA b/l, no w/r/r  Chest:minimal left chest wall ttp, no ecchymosis, lidoderm patch in place   Cardiovascular: S1 and S2, RRR,  4/6 DEAN   Gastrointestinal: obese, +BS, soft, NTND, no rebound/guarding  Extremities: chronic changes LE, nonpitting edema  Vascular: 2+ peripheral pulses  Musculoskeletal: 5/5 strength b/l UE and LE, sensation intact  Skin: No rashes        LABS: All Labs Reviewed:                        10.0   8.7   )-----------( 279      ( 20 Mar 2017 06:57 )             30.8     21 Mar 2017 08:21    146    |  109    |  20     ----------------------------<  125    4.0     |  31     |  0.64     Ca    8.4        21 Mar 2017 08:21        CARDIAC MARKERS ( 21 Mar 2017 08:21 )  x     / x     / 196 U/L / x     / x      CARDIAC MARKERS ( 20 Mar 2017 06:57 )  x     / x     / 294 U/L / x     / x              Culture - Blood (03.19.17 @ 02:07)    Specimen Source: .Blood Blood    Culture Results:   No growth to date.          2D ECHOCARDIOGRAM AD   03/20/2017  Summary     The left ventricle is normal in size. Left ventricular wall motion is   normal.   Estimated left ventricular ejection fraction is 70-75 %   The left atrium is mildly dilated.   Fibrocalcific changes noted to the aortic valve leaflets with mild   restriction in leaflet excursion. Mean transaortic gradient is 21mmHg   with   a peak velocity of 3m/sec; this finding is consistent with mild aortic   stenosis. No aortic insufficiency was seen.   Fibrocalcific changes noted to the anterior mitral valve leaflet with   with   mild restriction in leaflet excursion. Moderate posterior mitral leaflet   and posterior mitral annular calcification. Trace to mild mitral   regurgitation is present.   EA reversal of the mitral inflow consistent with reduced compliance of   the   left ventricle.   Mild (1+) tricuspid valve regurgitation is present.   A small pericardial effusion is present.
Pt seen and examined earlier today. States she was urinating frequently probably because she was drinking a lot of water because she's so thirsty. No dysuria. No suprapubic pain. No f/c/r.         Review of system- All 10 systems reviewed and is as per HPI otherwise negative.           T(C): 36.7, Max: 37.4 (03-22 @ 03:50)  HR: 92 (90 - 92)  BP: 171/59 (155/61 - 180/68)  RR: 16 (16 - 18)  SpO2: 91% (89% - 91%)  Wt(kg): --    PHYSICAL EXAM:    GENERAL: Comfortable, no acute distress  HEAD:  Atraumatic, Normocephalic  EYES: EOMI, PERRLA  HEENT: DRY mucous membranes  NECK: Supple, No JVD  NERVOUS SYSTEM:  Non focal  CHEST/LUNG: Clear to auscultation bilaterally  HEART: S1, S2+.Regular rate and rhythm; No murmurs, rubs, or gallops  ABDOMEN: Soft, Nontender, Nondistended; Bowel sounds present  GENITOURINARY- Voiding, no palpable bladder  EXTREMITIES:  No clubbing, cyanosis, or edema  MUSCULOSKELTAL- No muscle tenderness, No joint tenderness  SKIN-no rash        LABS:    21 Mar 2017 08:21    146    |  109    |  20     ----------------------------<  125    4.0     |  31     |  0.64     Ca    8.4        21 Mar 2017 08:21  MEDS  heparin  Injectable 5000Unit(s) SubCutaneous every 12 hours  docusate sodium 100milliGRAM(s) Oral three times a day  donepezil 5milliGRAM(s) Oral at bedtime  aspirin  chewable 81milliGRAM(s) Oral daily  atorvastatin 20milliGRAM(s) Oral at bedtime  acetaminophen   Tablet 650milliGRAM(s) Oral every 6 hours PRN  lidocaine   Patch 1Patch Transdermal daily  oxybutynin 5milliGRAM(s) Oral daily  sodium chloride 0.9%. 1000milliLiter(s) IV Continuous <Continuous>
History of Present Illness: 	  81y female w/ pmh HTN, dyslipidemia, ?SAH after fall, mild diastolic dysfunction, chronic LE edema was brought to ED  after   being found on her floor at home by daughter (pt lives alone w/o aide).  As per chart pt also had fall on Thursday but refused to come to ED.  Pt c/o rib pain and found to have age indeterminate rib fractures.   As per chart, pt last admitted here twice in April 2016 w/ fall, rhabdo and confusion.  At that time it was discussed w/ daughter that pt has been falling frequently but refused home care and insisted on living alone.   Pt was then discharged to rehab at [facility] and returned a few days later w/ confusion.  She eval by Psych during that visit and deemed to have delusional, odd behavior and possibly new onset dementia and aricept started.       Pt seen and examined earlier today.  She is AAOx 3, understands why she is here, admits to living alone and refusing home care for years.  States she pays bills and makes pea pod meals, ambulates w/ walker and then states only thing she doesn't do is drive.    Pt states she lost her balance at home, denies LOC, hitting head, preceding dizziness/cp/palp.   Feeling well now and asking when she can leave.  Complains of left rib pain.     3/20- feeling better today.  Rib pain controlled.  OOB  w/ PT.  Doesn't want rehab.   Left voicemail for daughter, Yarely.     10pt ROS negative.     Vital Signs Last 24 Hrs  T(C): 36.9, Max: 36.9 (03-19 @ 17:34)  T(F): 98.4, Max: 98.4 (03-19 @ 17:34)  HR: 80 (71 - 80)  BP: 155/55 (125/45 - 155/55)  RR: 17 (17 - 18)  SpO2: 97% (95% - 98%)    PHYSICAL EXAM:  General: elderly female, comfortably seated in bed, nad  Neuro: AAOx3, no focal deficits  HEENT: NCAT, EOMI  Neck: Soft and supple, No JVD  Respiratory: CTA b/l, no w/r/r  Chest:minimal left chest wall ttp, no ecchymosis, lidoderm patch in place   Cardiovascular: S1 and S2, RRR,  4/6 DEAN   Gastrointestinal: obese, +BS, soft, NTND, no rebound/guarding  Extremities: chronic changes LE, nonpitting edema  Vascular: 2+ peripheral pulses  Musculoskeletal: 5/5 strength b/l UE and LE, sensation intact  Skin: No rashes        LABS: All Labs Reviewed:                        10.0   8.7   )-----------( 279      ( 20 Mar 2017 06:57 )             30.8     20 Mar 2017 06:57    147    |  109    |  26     ----------------------------<  115    3.0     |  29     |  0.58     Ca    8.0        20 Mar 2017 06:57  Phos  3.0       19 Mar 2017 09:14  Mg     2.1       19 Mar 2017 09:14    TPro  x      /  Alb  x      /  TBili  1.3    /  DBili  0.3    /  AST  x      /  ALT  x      /  AlkPhos  x      19 Mar 2017 12:23    PT/INR - ( 19 Mar 2017 02:13 )   PT: 13.5 sec;   INR: 1.22 ratio         PTT - ( 19 Mar 2017 02:13 )  PTT:38.7 sec  CARDIAC MARKERS ( 20 Mar 2017 06:57 )  x     / x     / 294 U/L / x     / x      CARDIAC MARKERS ( 19 Mar 2017 07:34 )  0.030 ng/mL / x     / 818 U/L / x     / x      CARDIAC MARKERS ( 19 Mar 2017 02:13 )  0.024 ng/mL / x     / x     / x     / x            Culture - Blood (03.19.17 @ 02:07)    Specimen Source: .Blood Blood    Culture Results:   No growth to date.

## 2017-03-22 NOTE — PROGRESS NOTE ADULT - ASSESSMENT
A&P  81y female admitted w/     *age indeterminate left lateral 6th through 8th rib fractures due to mechanical fall   - pt denies preceding sx, ekg unremarkable, CT head neg, Cspine- chronic C6 fx   - significant DEAN auscultated- will check echo for completeness (prior 4/2016 w/ nml valvular function)  - pain control, fall precautions  - PT eval and Surgery f/u appreciated   - SW, Case management eval- pt w/ recurrent admissions for falls, lives alone, unsafe d/c home    *mild rhabdomyolysis  - due to falls (Thurs, Sat)  - improving w/ IVF     *leukocytosis  - likely reactive- resolved  - afebrile, no signs infections, UA neg.     *hypokalemia  - replete    *HTN, HLD  - home meds    *?early dementia as per 4/2016 notes  - pt prescribed aricept at that time but not taking    *renal mass dx 6/2015  - pt aware and has refused workup in past- discussed again and states she wants to think about further eval    *dvt px   - heparin sc     *code status  - full code- pt wants to think about it
81y female w/ pmh HTN, dyslipidemia, ?SAH after fall, mild diastolic dysfunction, chronic LE edema was brought to ED  after   being found on her floor at home by daughter (pt lives alone w/o aide).  As per chart pt also had fall on Thursday but refused to come to ED.  Pt c/o rib pain and found to have age indeterminate rib fractures.     Pt stated she lost her balance at home, denies LOC, hitting head, preceding dizziness/cp/palp.     1.age indeterminate left lateral 6th through 8th rib fractures due to mechanical fall   - pt denies preceding sx, ekg unremarkable, CT head neg, Cspine- chronic C6 fx    and Echo w/ normal LVEF and no significant valvular abnormalities  - pain control, fall precautions  - PT eval and Surgery f/u appreciated   - SW, Case management eval- pt w/ recurrent admissions for falls, lives alone, unsafe d/c home, planning d/c to [facility]    2. mild rhabdomyolysis  - due to falls (Thurs, Sat)  - resolved w/ IVF     3. leukocytosis  - likely reactive- resolved  - afebrile, no signs infections, UA neg.     4. Dehydration:  -start iVF    5. HTN, HLD  - continue home meds    6. ?early dementia as per 4/2016 notes  - pt prescribed aricept at that time but not taking    7. renal mass dx 6/2015  - pt aware and has refused workup in past- discussed again and states she wants to think about eventual further eval    8. dispo:  IVF for dehydration  dc planning to [facility] in am
A&P  81y female admitted w/     *age indeterminate left lateral 6th through 8th rib fractures due to mechanical fall   - pt denies preceding sx, ekg unremarkable, CT head neg, Cspine- chronic C6 fx   and Echo w/ normal LVEF and no significant valvular abnormalities  - pain control, fall precautions  - PT eval and Surgery f/u appreciated   - SW, Case management eval- pt w/ recurrent admissions for falls, lives alone, unsafe d/c home, planning d/c to [facility] after 3 nights    *mild rhabdomyolysis  - due to falls (Thurs, Sat)  - resolved w/ IVF     *leukocytosis  - likely reactive- resolved  - afebrile, no signs infections, UA neg.     *hypokalemia, hypernatremia  - received IVF  - K repleted, encourage po fluid intake     *HTN, HLD  - home meds    *?early dementia as per 4/2016 notes  - pt prescribed aricept at that time but not taking    *renal mass dx 6/2015  - pt aware and has refused workup in past- discussed again and states she wants to think about eventual further eval    *dvt px   - heparin sc     *code status  - full code- pt wants to think about it    3/21 Dispo- d/c planning to [facility] probably tomorrow.

## 2017-03-22 NOTE — CONSULT NOTE ADULT - ASSESSMENT
81 year old female with no pas psychiatric history, admitted due to fall and rib fractures, with no past psychiatric history. There is questionable dementia in past though she might have been delirious which can present with hallucinations and delusions which she is not presenting with at this time. There are psychodynamic issues it seems between pt and daughter, but there are no acute psychiatric sx at this time, and there was nothing in interview and mental status exam that would support lack of capacity, therefore pt is able to make her decisions regarding disposition and medical care.     If there are further concerns that hoarding is causing an unsafe situation at home, APS should be called again so that a visit to her home might shed more light on the situation. Also advise daughter to bring pt to ER if acute psychiatric sx do emerge that pose danger to pt or others.     Pt is cleared psychiatrically at this time  Signing off, please call with questions.
89 yo fem frequent falls, likely old C6 fracture. 3 left sided rib fracture, likely old, no pneumothorax. Normal head Ct, normal Abd CT  -Very high risk for future falls. Patient should be recommended NH placement or assisted living (24 hrs aide)  -No acute active issues on chest CT or Abd Ct scan  -Could consult neurosurgery regarding likely old C6 fracture  -PT consult  -Keep lower ext elevated

## 2017-03-23 ENCOUNTER — TRANSCRIPTION ENCOUNTER (OUTPATIENT)
Age: 82
End: 2017-03-23

## 2017-03-23 VITALS
TEMPERATURE: 99 F | OXYGEN SATURATION: 98 % | SYSTOLIC BLOOD PRESSURE: 161 MMHG | RESPIRATION RATE: 18 BRPM | DIASTOLIC BLOOD PRESSURE: 73 MMHG | HEART RATE: 95 BPM

## 2017-03-23 LAB
ANION GAP SERPL CALC-SCNC: 11 MMOL/L — SIGNIFICANT CHANGE UP (ref 5–17)
BASOPHILS # BLD AUTO: 0.1 K/UL — SIGNIFICANT CHANGE UP (ref 0–0.2)
BASOPHILS NFR BLD AUTO: 1.2 % — SIGNIFICANT CHANGE UP (ref 0–2)
BUN SERPL-MCNC: 18 MG/DL — SIGNIFICANT CHANGE UP (ref 7–23)
CALCIUM SERPL-MCNC: 9 MG/DL — SIGNIFICANT CHANGE UP (ref 8.5–10.1)
CHLORIDE SERPL-SCNC: 108 MMOL/L — SIGNIFICANT CHANGE UP (ref 96–108)
CO2 SERPL-SCNC: 26 MMOL/L — SIGNIFICANT CHANGE UP (ref 22–31)
CREAT SERPL-MCNC: 0.63 MG/DL — SIGNIFICANT CHANGE UP (ref 0.5–1.3)
EOSINOPHIL # BLD AUTO: 0.4 K/UL — SIGNIFICANT CHANGE UP (ref 0–0.5)
EOSINOPHIL NFR BLD AUTO: 3.9 % — SIGNIFICANT CHANGE UP (ref 0–6)
GLUCOSE SERPL-MCNC: 122 MG/DL — HIGH (ref 70–99)
HCT VFR BLD CALC: 34.8 % — SIGNIFICANT CHANGE UP (ref 34.5–45)
HGB BLD-MCNC: 11.1 G/DL — LOW (ref 11.5–15.5)
LYMPHOCYTES # BLD AUTO: 1.2 K/UL — SIGNIFICANT CHANGE UP (ref 1–3.3)
LYMPHOCYTES # BLD AUTO: 12.8 % — LOW (ref 13–44)
MAGNESIUM SERPL-MCNC: 1.8 MG/DL — SIGNIFICANT CHANGE UP (ref 1.8–2.4)
MCHC RBC-ENTMCNC: 27.3 PG — SIGNIFICANT CHANGE UP (ref 27–34)
MCHC RBC-ENTMCNC: 31.8 GM/DL — LOW (ref 32–36)
MCV RBC AUTO: 85.9 FL — SIGNIFICANT CHANGE UP (ref 80–100)
MONOCYTES # BLD AUTO: 1.3 K/UL — HIGH (ref 0–0.9)
MONOCYTES NFR BLD AUTO: 14.2 % — HIGH (ref 2–14)
NEUTROPHILS # BLD AUTO: 6.4 K/UL — SIGNIFICANT CHANGE UP (ref 1.8–7.4)
NEUTROPHILS NFR BLD AUTO: 67.9 % — SIGNIFICANT CHANGE UP (ref 43–77)
PHOSPHATE SERPL-MCNC: 2.9 MG/DL — SIGNIFICANT CHANGE UP (ref 2.5–4.5)
PLATELET # BLD AUTO: 336 K/UL — SIGNIFICANT CHANGE UP (ref 150–400)
POTASSIUM SERPL-MCNC: 4.2 MMOL/L — SIGNIFICANT CHANGE UP (ref 3.5–5.3)
POTASSIUM SERPL-SCNC: 4.2 MMOL/L — SIGNIFICANT CHANGE UP (ref 3.5–5.3)
RBC # BLD: 4.06 M/UL — SIGNIFICANT CHANGE UP (ref 3.8–5.2)
RBC # FLD: 13.1 % — SIGNIFICANT CHANGE UP (ref 10.3–14.5)
SODIUM SERPL-SCNC: 145 MMOL/L — SIGNIFICANT CHANGE UP (ref 135–145)
WBC # BLD: 9.5 K/UL — SIGNIFICANT CHANGE UP (ref 3.8–10.5)
WBC # FLD AUTO: 9.5 K/UL — SIGNIFICANT CHANGE UP (ref 3.8–10.5)

## 2017-03-23 RX ORDER — DONEPEZIL HYDROCHLORIDE 10 MG/1
1 TABLET, FILM COATED ORAL
Qty: 0 | Refills: 0 | COMMUNITY
Start: 2017-03-23

## 2017-03-23 RX ORDER — LISINOPRIL 2.5 MG/1
1 TABLET ORAL
Qty: 0 | Refills: 0 | COMMUNITY

## 2017-03-23 RX ORDER — LISINOPRIL 2.5 MG/1
1 TABLET ORAL
Qty: 0 | Refills: 0 | COMMUNITY
Start: 2017-03-23

## 2017-03-23 RX ORDER — LIDOCAINE 4 G/100G
1 CREAM TOPICAL
Qty: 0 | Refills: 0 | COMMUNITY
Start: 2017-03-23

## 2017-03-23 RX ORDER — LISINOPRIL 2.5 MG/1
10 TABLET ORAL ONCE
Qty: 0 | Refills: 0 | Status: COMPLETED | OUTPATIENT
Start: 2017-03-23 | End: 2017-03-23

## 2017-03-23 RX ADMIN — HEPARIN SODIUM 5000 UNIT(S): 5000 INJECTION INTRAVENOUS; SUBCUTANEOUS at 17:09

## 2017-03-23 RX ADMIN — Medication 100 MILLIGRAM(S): at 13:53

## 2017-03-23 RX ADMIN — Medication 81 MILLIGRAM(S): at 11:06

## 2017-03-23 RX ADMIN — LISINOPRIL 10 MILLIGRAM(S): 2.5 TABLET ORAL at 15:19

## 2017-03-23 RX ADMIN — LISINOPRIL 10 MILLIGRAM(S): 2.5 TABLET ORAL at 06:46

## 2017-03-23 RX ADMIN — HEPARIN SODIUM 5000 UNIT(S): 5000 INJECTION INTRAVENOUS; SUBCUTANEOUS at 06:00

## 2017-03-23 RX ADMIN — Medication 5 MILLIGRAM(S): at 11:06

## 2017-03-23 RX ADMIN — LIDOCAINE 1 PATCH: 4 CREAM TOPICAL at 00:00

## 2017-03-23 RX ADMIN — LIDOCAINE 1 PATCH: 4 CREAM TOPICAL at 11:06

## 2017-03-23 NOTE — DISCHARGE NOTE ADULT - PATIENT PORTAL LINK FT
“You can access the Follow[facility] Patient Portal, offered by [facility], by registering with the following website: [email or web address]”

## 2017-03-23 NOTE — DISCHARGE NOTE ADULT - MEDICATION SUMMARY - MEDICATIONS TO STOP TAKING
I will STOP taking the medications listed below when I get home from the hospital:    benazepril-hydroCHLOROthiazide 20 mg-12.5 mg oral tablet  -- 1 tab(s) by mouth once a day

## 2017-03-23 NOTE — DISCHARGE NOTE ADULT - MEDICATION SUMMARY - MEDICATIONS TO TAKE
I will START or STAY ON the medications listed below when I get home from the hospital:    aspirin 81 mg oral delayed release capsule  --  by mouth   -- Indication: For antiplatelet    lisinopril 20 mg oral tablet  -- 1 tab(s) by mouth once a day  -- Indication: For Hypertension    Lipitor 20 mg oral tablet  -- 1 tab(s) by mouth once a day  -- Indication: For Hyperlipidemia    donepezil 5 mg oral tablet  -- 1 tab(s) by mouth once a day (at bedtime)  -- Indication: For Dementia    lidocaine 5% topical film  -- Apply on skin to affected area once a day  -- Indication: For pain    oxybutynin 5 mg/24 hours oral tablet, extended release  -- 1 tab(s) by mouth once a day  -- Indication: For overactive bladder

## 2017-03-23 NOTE — DISCHARGE NOTE ADULT - HOSPITAL COURSE
81y female w/ pmh HTN, dyslipidemia, ?SAH after fall, mild diastolic dysfunction, chronic LE edema was brought to ED  after   being found on her floor at home by daughter (pt lives alone w/o aide).  As per chart pt also had fall on Thursday but refused to come to ED.  Pt c/o rib pain and found to have age indeterminate rib fractures.     Pt stated she lost her balance at home, denies LOC, hitting head, preceding dizziness/cp/palp. She was admitted. Taken off hctz for dehydration. Given ivf for mild rhabdo and dehydration with improvement. Her daughter felt she may have been delusional, she was evaluated by psychiatry and was not felt to be delusional or have any psychiatric illness and was felt to have capacity to make her own medical decisions. She had some leukocytosis initially which resolved and was felt to be reactive. Her bp has been elevated. Her lisinopril will be increased and she will remain off hctz. She is now medically stable for dc to snf.    ICU Vital Signs Last 24 Hrs  T(C): 36.3, Max: 37.5 (03-22 @ 18:19)  T(F): 97.3, Max: 99.5 (03-22 @ 18:19)  HR: 83 (83 - 92)  BP: 178/60 (168/83 - 178/60)  RR: 19 (16 - 19)  SpO2: 98% (95% - 98%)    PHYSICAL EXAM:    GENERAL: Comfortable, no acute distress   HEAD:  Normocephalic, atraumatic  EYES: EOMI, PERRLA  HEENT: Moist mucous membranes  NECK: Supple, No JVD  NERVOUS SYSTEM:  Alert & Oriented X3, Motor Strength 5/5 B/L upper and lower extremities  CHEST/LUNG: Clear to auscultation bilaterally  HEART: Regular rate and rhythm  ABDOMEN: Soft, Nontender, Nondistended, Bowel sounds present  GENITOURINARY: Voiding, no palpable bladder  EXTREMITIES: b/l LE lymphedema  MUSCULOSKELTAL- No muscle tenderness, no joint tenderness  SKIN-no rash  LABS:                        11.1   9.5   )-----------( 336      ( 23 Mar 2017 06:51 )             34.8     23 Mar 2017 06:51    145    |  108    |  18     ----------------------------<  122    4.2     |  26     |  0.63     Ca    9.0        23 Mar 2017 06:51  Phos  2.9       23 Mar 2017 06:51  Mg     1.8       23 Mar 2017 06:51                    Final diagnosi:    1. age indeterminate left lateral 6th through 8th rib fractures due to mechanical fall   - pt denies preceding sx, ekg unremarkable, CT head neg, Cspine- chronic C6 fx    and Echo w/ normal LVEF and no significant valvular abnormalities    2. mild rhabdomyolysis  - due to falls (Thurs, Sat)  - resolved w/ IVF     3. leukocytosis  - likely reactive- resolved  - afebrile, no signs infections, UA neg.     4. Dehydration:  -improved with ivf    5. HTN, HLD  -increase lisinopril dose  -will remain off hctz due to dehydration  -cont statin  6. ?early dementia as per 4/2016 notes  - pt prescribed aricept at that time but was not taking  -restarted here    7. renal mass dx 6/2015  - pt aware and has refused workup in past- discussed again and states she wants to think about eventual further eval  time taken in preparation for discharge 65 minutes  plan discussed with patient in detail

## 2017-03-23 NOTE — DISCHARGE NOTE ADULT - ADDITIONAL INSTRUCTIONS
repeat basic metabolic panel in 3 days.   Titrate bp meds for better blood pressure control. Ace inhibitor dose was increased.

## 2017-03-23 NOTE — DISCHARGE NOTE ADULT - CARE PLAN
Principal Discharge DX:	Dehydration  Goal:	prevent recurrence  Instructions for follow-up, activity and diet:	stay off hydrochlorothiazide.

## 2017-03-24 LAB
CULTURE RESULTS: SIGNIFICANT CHANGE UP
CULTURE RESULTS: SIGNIFICANT CHANGE UP
SPECIMEN SOURCE: SIGNIFICANT CHANGE UP
SPECIMEN SOURCE: SIGNIFICANT CHANGE UP

## 2017-03-28 DIAGNOSIS — E87.6 HYPOKALEMIA: ICD-10-CM

## 2017-03-28 DIAGNOSIS — F03.90 UNSPECIFIED DEMENTIA, UNSPECIFIED SEVERITY, WITHOUT BEHAVIORAL DISTURBANCE, PSYCHOTIC DISTURBANCE, MOOD DISTURBANCE, AND ANXIETY: ICD-10-CM

## 2017-03-28 DIAGNOSIS — M62.82 RHABDOMYOLYSIS: ICD-10-CM
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
## 2017-03-28 DIAGNOSIS — E86.0 DEHYDRATION: ICD-10-CM

## 2017-03-28 DIAGNOSIS — R07.9 CHEST PAIN, UNSPECIFIED: ICD-10-CM

## 2017-03-28 DIAGNOSIS — I10 ESSENTIAL (PRIMARY) HYPERTENSION: ICD-10-CM

## 2017-03-28 DIAGNOSIS — M84.48XA PATHOLOGICAL FRACTURE, OTHER SITE, INITIAL ENCOUNTER FOR FRACTURE: ICD-10-CM

## 2017-03-28 DIAGNOSIS — S22.42XA MULTIPLE FRACTURES OF RIBS, LEFT SIDE, INITIAL ENCOUNTER FOR CLOSED FRACTURE: ICD-10-CM

## 2017-03-28 DIAGNOSIS — E78.5 HYPERLIPIDEMIA, UNSPECIFIED: ICD-10-CM
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
## 2017-03-29 DIAGNOSIS — E87.0 HYPEROSMOLALITY AND HYPERNATREMIA: ICD-10-CM

## 2017-07-17 NOTE — PHYSICAL THERAPY INITIAL EVALUATION ADULT - SITTING BALANCE: DYNAMIC
PA at bedside
Patient discharged by provider. Ambulated to waiting room. No distress noted.
poor balance

## 2018-12-06 NOTE — PATIENT PROFILE ADULT. - FUNCTIONAL LEVEL PRIOR: EATING
Patient Instructions by Emery Viera RN, BSN at 10/18/17 10:15 AM     Author:  Emery Viera RN, BSN Service:  (none) Author Type:  Registered Nurse     Filed:  10/18/17 10:25 AM Encounter Date:  10/18/2017 Status:  Addendum     :  Emery Viera RN, BSN (Registered Nurse)            TREATMENT ORDERS    Forms    Occupational therapy     [facility]   [address] [facility]  [address] [address]  [address] [address] [facility]  [address] [address] [facility]  [address] [address] [place]  [address]          [phone]            · Occupational Therapy (Hand/Upper extremity therapy) is offered at the [facility]  · Please dress according to treatment area (ex: knee treatment needs to wear shorts)  · Co-payments are due at the time of appointment  · To ensure your visit goes as smooth as possible, please check your insurance benefits for coverage of physical therapy and bring a copy of your insurance card.  · A custom [facility] night time splint will be made for you.     ICE :  Ice:  apply ice for 20 minutes 2-3 times a day.  No barrier between the ice and skin is needed when using cubes or frozen peas.  If you are using a chemical ice pack please place a barrier between the ice pack and your skin.      Work Status  Return to modified work: no lifting greater than 5lb with the left upper extremity.       Follow-Up  Please keep your appointment with David Lagunas D.O. For 11/17/17 at 8:30am, [facility] left: 10/18/2017 10:15 AM     Next appointment:        Location:        Provider:         Please note: 24 hour notice for cancellation of appointment is required.      Do not hesitate to call if you are experiencing severe pain, worsening or change in your pain, have symptoms of infection (fever, warmth, redness, increased drainage), or have any other problem that concerns you ~  [phone] (or [phone] after hours).    Please remember when requesting refills on pain medication that the request should be made by Thursday at the latest. [facility] is open Monday-Friday, 8am-5pm, and closed on the weekends.  No narcotic refills will be filled after hours.    Additional Educational Resources:  For additional resources regarding your symptoms, diagnosis, or further health information, please visit the Health Resources section on [email or web address] or the Online Health Resources section in [employer].          Revision History        User Key Date/Time User Provider Type Action    > [N/A] 10/18/17 10:25 AM Emery Viera, RN, BSN Registered Nurse Addend     [N/A] 10/18/17 10:19 AM Emery Viera, RN, BSN Registered Nurse Sign             (0) independent

## 2019-03-30 NOTE — PHYSICAL THERAPY INITIAL EVALUATION ADULT - ADL SKILLS, REHAB EVAL
pt left without telling anyone. IV line not removed by RN or MD. attempted to contact patient at home phone number with no response. MD Urbano aware.
needed assist/needs device

## 2022-04-04 NOTE — ED ADULT TRIAGE NOTE - ACCOMPANIED BY
EMT/paramedic